# Patient Record
Sex: FEMALE | Race: WHITE | NOT HISPANIC OR LATINO
[De-identification: names, ages, dates, MRNs, and addresses within clinical notes are randomized per-mention and may not be internally consistent; named-entity substitution may affect disease eponyms.]

---

## 2017-10-02 ENCOUNTER — FORM ENCOUNTER (OUTPATIENT)
Age: 37
End: 2017-10-02

## 2020-10-21 ENCOUNTER — FORM ENCOUNTER (OUTPATIENT)
Age: 40
End: 2020-10-21

## 2020-10-26 ENCOUNTER — FORM ENCOUNTER (OUTPATIENT)
Age: 40
End: 2020-10-26

## 2020-11-08 ENCOUNTER — FORM ENCOUNTER (OUTPATIENT)
Age: 40
End: 2020-11-08

## 2022-02-28 PROBLEM — Z00.00 ENCOUNTER FOR PREVENTIVE HEALTH EXAMINATION: Status: ACTIVE | Noted: 2022-02-28

## 2022-03-07 ENCOUNTER — NON-APPOINTMENT (OUTPATIENT)
Age: 42
End: 2022-03-07

## 2022-03-16 ENCOUNTER — APPOINTMENT (OUTPATIENT)
Dept: BREAST CENTER | Facility: CLINIC | Age: 42
End: 2022-03-16
Payer: COMMERCIAL

## 2022-03-16 ENCOUNTER — NON-APPOINTMENT (OUTPATIENT)
Age: 42
End: 2022-03-16

## 2022-03-16 VITALS
DIASTOLIC BLOOD PRESSURE: 76 MMHG | HEIGHT: 66 IN | WEIGHT: 130.25 LBS | HEART RATE: 82 BPM | BODY MASS INDEX: 20.93 KG/M2 | SYSTOLIC BLOOD PRESSURE: 119 MMHG | OXYGEN SATURATION: 97 %

## 2022-03-16 DIAGNOSIS — N63.0 UNSPECIFIED LUMP IN UNSPECIFIED BREAST: ICD-10-CM

## 2022-03-16 PROCEDURE — 99213 OFFICE O/P EST LOW 20 MIN: CPT

## 2022-03-17 NOTE — PAST MEDICAL HISTORY
[Menstruating] : The patient is menstruating [Menarche Age ____] : age at menarche was [unfilled] [Definite ___ (Date)] : the last menstrual period was [unfilled] [Irregular Cycle Intervals] : are  irregular [Total Preg ___] : G[unfilled] [History of Hormone Replacement Treatment] : has no history of hormone replacement treatment [FreeTextEntry5] : froze 2 cycle of eggs in 2021 [FreeTextEntry7] : none

## 2022-03-17 NOTE — DATA REVIEWED
[FreeTextEntry1] : 02/28/2022: B/L MG & US- Dense. L central calcs, c/w benign milk of calcium. US- R 1.1cm 12:00, 1FN hypoechioc mass which previously measured 1.4cm, probably benign. R 0.6cm 10:00, 6FN benign appearing cyst. L 0.8cm 4:00, 8FN benign appearing cyst. BIRADS 3 \par  \par

## 2022-03-17 NOTE — HISTORY OF PRESENT ILLNESS
[FreeTextEntry1] : 41 year premenopausal  female previously followed by Dr. Kruse presents for follow up of palpable abnormality of right breast and lumpiness of left breast. Patient states she is able to palpate a lump in her right breast around the 12:00 region, first noted approximately 2014. She also reports chronic lumpiness of left outer aspect of breast, first noted at the age of 27. Of note, patient states in 2020, she was originally recommended for biopsy of the right breast nodule at 12:00; however, on targeted US, the nodule did not look suspicious and so biopsy wasn't performed. Denies no skin changes/dimpling, no nipple discharge bilaterally. \par \par Denies history of breast biopsies or breast surgery. \par Denies family history of breast or ovarian cancer \par \par GEORGE lifetime risk of 11.3%\par \par Patient still understands that she is of high risk for breast cancer. Discussed transfer of care to Svitlana Das NP, who works along side me. Patient would like to be followed by Svitlana.\par

## 2022-05-17 ENCOUNTER — RESULT REVIEW (OUTPATIENT)
Age: 42
End: 2022-05-17

## 2023-03-02 ENCOUNTER — NON-APPOINTMENT (OUTPATIENT)
Age: 43
End: 2023-03-02

## 2023-03-03 ENCOUNTER — APPOINTMENT (OUTPATIENT)
Dept: BREAST CENTER | Facility: CLINIC | Age: 43
End: 2023-03-03
Payer: COMMERCIAL

## 2023-03-03 VITALS
HEART RATE: 79 BPM | SYSTOLIC BLOOD PRESSURE: 118 MMHG | DIASTOLIC BLOOD PRESSURE: 77 MMHG | WEIGHT: 136 LBS | BODY MASS INDEX: 21.86 KG/M2 | HEIGHT: 66 IN

## 2023-03-03 DIAGNOSIS — Z78.9 OTHER SPECIFIED HEALTH STATUS: ICD-10-CM

## 2023-03-03 DIAGNOSIS — Z80.8 FAMILY HISTORY OF MALIGNANT NEOPLASM OF OTHER ORGANS OR SYSTEMS: ICD-10-CM

## 2023-03-03 DIAGNOSIS — Z86.39 PERSONAL HISTORY OF OTHER ENDOCRINE, NUTRITIONAL AND METABOLIC DISEASE: ICD-10-CM

## 2023-03-03 DIAGNOSIS — Z80.0 FAMILY HISTORY OF MALIGNANT NEOPLASM OF DIGESTIVE ORGANS: ICD-10-CM

## 2023-03-03 DIAGNOSIS — Z80.7 FAMILY HISTORY OF OTHER MALIGNANT NEOPLASMS OF LYMPHOID, HEMATOPOIETIC AND RELATED TISSUES: ICD-10-CM

## 2023-03-03 PROCEDURE — 99213 OFFICE O/P EST LOW 20 MIN: CPT

## 2023-03-03 NOTE — PHYSICAL EXAM
[Normocephalic] : normocephalic [No Supraclavicular Adenopathy] : no supraclavicular adenopathy [Examined in the supine and seated position] : examined in the supine and seated position [Symmetrical] : symmetrical [No dominant masses] : no dominant masses left breast [No Nipple Retraction] : no left nipple retraction [No Nipple Discharge] : no left nipple discharge [Breast Nipple Inversion] : nipples not inverted [Breast Nipple Retraction] : nipples not retracted [Breast Nipple Flattening] : nipples not flattened [Breast Nipple Fissures] : nipples not fissured [No Axillary Lymphadenopathy] : no left axillary lymphadenopathy [No Edema] : no edema [No Rashes] : no rashes [No Ulceration] : no ulceration [de-identified] : 12:30n1 there is a 1.5cm firm palpable nodule, consistent with hypoechoic mass as seen on imaging

## 2023-03-03 NOTE — ASSESSMENT
[FreeTextEntry1] : 43 yo female presents for follow-up of a palpable nodule in the right breast, seen as a lobulated mass on imaging. Reviewed recommendation to proceed with annual monitoring given its presentation on exam. Patient agrees; will get her scheduled for a mammo/US next year and RTC after for re-examination. \par \par Discussed self breast exams with the patient. Recommended simple pattern of palpation, while seated and while laying down. Recommended that she performs these breast exams at the same time every month, as to time it with her cycle. Discussed characteristics of a suspicious mass, such as irregular, hard like a rock and fixed/immobile. Patient understands.

## 2023-03-03 NOTE — REVIEW OF SYSTEMS
[Fever] : no fever [Chills] : no chills [As Noted in HPI] : as noted in HPI [Breast Lump] : breast lump [Negative] : Heme/Lymph

## 2023-03-03 NOTE — HISTORY OF PRESENT ILLNESS
[FreeTextEntry1] : 42 year premenopausal female presents for follow up of palpable abnormality of right breast and lumpiness of left breast. Patient states she is able to palpate a lump in her right breast around the 12:00 region, first noted approximately 2014. Denies no skin changes/dimpling, no nipple discharge bilaterally. \par \par GEORGE lifetime risk of 11.3% no family history of breast or ovarian cancer

## 2023-03-03 NOTE — PAST MEDICAL HISTORY
[Menstruating] : The patient is menstruating [Menarche Age ____] : age at menarche was [unfilled] [Irregular Cycle Intervals] : are  irregular [Total Preg ___] : G[unfilled] [Definite ___ (Date)] : the last menstrual period was [unfilled] [History of Hormone Replacement Treatment] : has no history of hormone replacement treatment [FreeTextEntry3] : has PCOS [FreeTextEntry5] : froze 2 cycle of eggs in 2021 [FreeTextEntry7] : none

## 2023-03-03 NOTE — DATA REVIEWED
[FreeTextEntry1] : 02/28/2022: B/L MG & US- Dense. L central calcs, c/w benign milk of calcium. US- R 1.1cm 12:00, 1FN hypoechioc mass which previously measured 1.4cm, probably benign. R 0.6cm 10:00, 6FN benign appearing cyst. L 0.8cm 4:00, 8FN benign appearing cyst. BIRADS 3 \par  \par 3/1/23 ([facility]) b/l sMmg & US: heterogenously dense. No evidence of malignancy. Recommend annual screening. BI-RADS 2.

## 2023-11-09 ENCOUNTER — APPOINTMENT (OUTPATIENT)
Dept: BREAST CENTER | Facility: CLINIC | Age: 43
End: 2023-11-09
Payer: COMMERCIAL

## 2023-11-09 VITALS
HEART RATE: 83 BPM | WEIGHT: 133 LBS | BODY MASS INDEX: 23.57 KG/M2 | HEIGHT: 63 IN | DIASTOLIC BLOOD PRESSURE: 79 MMHG | SYSTOLIC BLOOD PRESSURE: 116 MMHG

## 2023-11-09 DIAGNOSIS — N63.0 UNSPECIFIED LUMP IN UNSPECIFIED BREAST: ICD-10-CM

## 2023-11-09 DIAGNOSIS — R92.30 DENSE BREASTS, UNSPECIFIED: ICD-10-CM

## 2023-11-09 PROCEDURE — 99213 OFFICE O/P EST LOW 20 MIN: CPT

## 2023-11-09 RX ORDER — MULTIVIT-MIN/FOLIC/VIT K/LYCOP 400-300MCG
25 MCG TABLET ORAL
Refills: 0 | Status: ACTIVE | COMMUNITY

## 2024-03-15 ENCOUNTER — NON-APPOINTMENT (OUTPATIENT)
Age: 44
End: 2024-03-15

## 2024-11-14 ENCOUNTER — APPOINTMENT (OUTPATIENT)
Dept: BREAST CENTER | Facility: CLINIC | Age: 44
End: 2024-11-14
Payer: COMMERCIAL

## 2024-11-14 DIAGNOSIS — R92.30 DENSE BREASTS, UNSPECIFIED: ICD-10-CM

## 2024-11-14 DIAGNOSIS — N63.0 UNSPECIFIED LUMP IN UNSPECIFIED BREAST: ICD-10-CM

## 2024-11-14 DIAGNOSIS — Z12.39 ENCOUNTER FOR OTHER SCREENING FOR MALIGNANT NEOPLASM OF BREAST: ICD-10-CM

## 2024-11-14 PROCEDURE — 99213 OFFICE O/P EST LOW 20 MIN: CPT
